# Patient Record
Sex: FEMALE | HISPANIC OR LATINO | URBAN - METROPOLITAN AREA
[De-identification: names, ages, dates, MRNs, and addresses within clinical notes are randomized per-mention and may not be internally consistent; named-entity substitution may affect disease eponyms.]

---

## 2021-12-21 ENCOUNTER — PREPPED CHART (OUTPATIENT)
Dept: URBAN - METROPOLITAN AREA CLINIC 98 | Facility: CLINIC | Age: 56
End: 2021-12-21

## 2021-12-21 PROBLEM — H35.412 LATTICE DEGENERATION OF RETINA: Noted: 2021-12-21

## 2021-12-21 PROBLEM — H35.712 CENTRAL SEROUS CHORIORETINOPATHY: Noted: 2021-12-21

## 2021-12-21 PROBLEM — H35.723 PED: Noted: 2021-12-21

## 2021-12-21 PROBLEM — H53.19 PHOTOPSIA: Noted: 2021-12-21

## 2021-12-21 PROBLEM — H43.811 POSTERIOR VITREOUS DETACHMENT: Noted: 2021-12-21

## 2021-12-21 PROBLEM — H35.721 PED: Noted: 2021-12-21

## 2022-02-11 ENCOUNTER — OFFICE VISIT (OUTPATIENT)
Dept: HEMATOLOGY | Age: 57
End: 2022-02-11

## 2022-02-11 VITALS
HEART RATE: 57 BPM | DIASTOLIC BLOOD PRESSURE: 79 MMHG | SYSTOLIC BLOOD PRESSURE: 127 MMHG | RESPIRATION RATE: 18 BRPM | TEMPERATURE: 97.7 F | WEIGHT: 138 LBS

## 2022-02-11 DIAGNOSIS — R74.8 ELEVATED ALKALINE PHOSPHATASE LEVEL: Primary | ICD-10-CM

## 2022-02-11 PROBLEM — Z90.49 HISTORY OF CHOLECYSTECTOMY: Status: ACTIVE | Noted: 2022-02-11

## 2022-02-11 PROBLEM — I10 HYPERTENSION: Status: ACTIVE | Noted: 2022-02-11

## 2022-02-11 PROBLEM — K76.0 FATTY LIVER: Status: ACTIVE | Noted: 2022-02-11

## 2022-02-11 LAB
ALBUMIN SERPL-MCNC: 4.3 G/DL (ref 3.5–5)
ALBUMIN/GLOB SERPL: 1.1 {RATIO} (ref 1.1–2.2)
ALP SERPL-CCNC: 148 U/L (ref 45–117)
ALT SERPL-CCNC: 41 U/L (ref 12–78)
ANION GAP SERPL CALC-SCNC: 2 MMOL/L (ref 5–15)
AST SERPL-CCNC: 25 U/L (ref 15–37)
BASOPHILS # BLD: 0 K/UL (ref 0–0.1)
BASOPHILS NFR BLD: 1 % (ref 0–1)
BILIRUB DIRECT SERPL-MCNC: 0.2 MG/DL (ref 0–0.2)
BILIRUB SERPL-MCNC: 0.9 MG/DL (ref 0.2–1)
BUN SERPL-MCNC: 15 MG/DL (ref 6–20)
BUN/CREAT SERPL: 18 (ref 12–20)
CALCIUM SERPL-MCNC: 9.8 MG/DL (ref 8.5–10.1)
CHLORIDE SERPL-SCNC: 105 MMOL/L (ref 97–108)
CO2 SERPL-SCNC: 31 MMOL/L (ref 21–32)
CREAT SERPL-MCNC: 0.83 MG/DL (ref 0.55–1.02)
DIFFERENTIAL METHOD BLD: NORMAL
EOSINOPHIL # BLD: 0.1 K/UL (ref 0–0.4)
EOSINOPHIL NFR BLD: 1 % (ref 0–7)
ERYTHROCYTE [DISTWIDTH] IN BLOOD BY AUTOMATED COUNT: 12.2 % (ref 11.5–14.5)
EST. AVERAGE GLUCOSE BLD GHB EST-MCNC: 117 MG/DL
FERRITIN SERPL-MCNC: 179 NG/ML (ref 26–388)
GLOBULIN SER CALC-MCNC: 3.9 G/DL (ref 2–4)
GLUCOSE SERPL-MCNC: 100 MG/DL (ref 65–100)
HBA1C MFR BLD: 5.7 % (ref 4–5.6)
HBV SURFACE AB SER QL: NONREACTIVE
HBV SURFACE AB SER-ACNC: 8.62 MIU/ML
HBV SURFACE AG SER QL: 0.13 INDEX
HBV SURFACE AG SER QL: NEGATIVE
HCT VFR BLD AUTO: 43.6 % (ref 35–47)
HGB BLD-MCNC: 13.8 G/DL (ref 11.5–16)
IMM GRANULOCYTES # BLD AUTO: 0 K/UL (ref 0–0.04)
IMM GRANULOCYTES NFR BLD AUTO: 0 % (ref 0–0.5)
IRON SATN MFR SERPL: 18 % (ref 20–50)
IRON SERPL-MCNC: 70 UG/DL (ref 35–150)
LYMPHOCYTES # BLD: 2 K/UL (ref 0.8–3.5)
LYMPHOCYTES NFR BLD: 43 % (ref 12–49)
MCH RBC QN AUTO: 30 PG (ref 26–34)
MCHC RBC AUTO-ENTMCNC: 31.7 G/DL (ref 30–36.5)
MCV RBC AUTO: 94.8 FL (ref 80–99)
MONOCYTES # BLD: 0.3 K/UL (ref 0–1)
MONOCYTES NFR BLD: 6 % (ref 5–13)
NEUTS SEG # BLD: 2.2 K/UL (ref 1.8–8)
NEUTS SEG NFR BLD: 49 % (ref 32–75)
NRBC # BLD: 0 K/UL (ref 0–0.01)
NRBC BLD-RTO: 0 PER 100 WBC
PLATELET # BLD AUTO: 218 K/UL (ref 150–400)
PMV BLD AUTO: 12.4 FL (ref 8.9–12.9)
POTASSIUM SERPL-SCNC: 4.2 MMOL/L (ref 3.5–5.1)
PROT SERPL-MCNC: 8.2 G/DL (ref 6.4–8.2)
RBC # BLD AUTO: 4.6 M/UL (ref 3.8–5.2)
SODIUM SERPL-SCNC: 138 MMOL/L (ref 136–145)
TIBC SERPL-MCNC: 388 UG/DL (ref 250–450)
WBC # BLD AUTO: 4.5 K/UL (ref 3.6–11)

## 2022-02-11 PROCEDURE — 99204 OFFICE O/P NEW MOD 45 MIN: CPT | Performed by: INTERNAL MEDICINE

## 2022-02-11 PROCEDURE — 91200 LIVER ELASTOGRAPHY: CPT | Performed by: INTERNAL MEDICINE

## 2022-02-11 RX ORDER — NEBIVOLOL 5 MG/1
TABLET ORAL
COMMUNITY
Start: 2021-07-10

## 2022-02-11 NOTE — Clinical Note
2/28/2022 7:33 AM    Ms. [facility]  [place] [id number]              Sincerely,      Zeb Aguilar MD

## 2022-02-11 NOTE — Clinical Note
NOTIFICATION RETURN TO WORK / SCHOOL    2/28/2022 7:33 AM    [address]  [address] [address]      To Whom It May Concern:    Alexx Richardson is currently under the care of [address] She will return to work/school on: ***    If there are questions or concerns please have the patient contact our office.         Sincerely,      Daphne Tabor MD

## 2022-02-11 NOTE — PROGRESS NOTES
Identified pt with two pt identifiers(name and ). Reviewed record in preparation for visit and have obtained necessary documentation. All patient medications has been reviewed. Chief Complaint   Patient presents with    Elevated Liver Enzymes    Fatty Liver    New Patient       3 most recent PHQ Screens 2022   Little interest or pleasure in doing things Not at all   Feeling down, depressed, irritable, or hopeless Not at all   Total Score PHQ 2 0     No flowsheet data found. Health Maintenance Due   Topic    Hepatitis C Screening     Depression Screen     Cervical cancer screen     Lipid Screen     Colorectal Cancer Screening Combo     Shingrix Vaccine Age 50> (1 of 2)    Breast Cancer Screen Mammogram     Flu Vaccine (1)    COVID-19 Vaccine (3 - Booster for Silverman Peter series)     Health Maintenance Review: Patient reminded of \"due or due soon\" health maintenance. I have asked the patient to contact his/her primary care provider (PCP) for follow-up on his/her health maintenance. Vitals:    22 0930   BP: 127/79   Pulse: (!) 57   Resp: 18   Temp: 97.7 °F (36.5 °C)   TempSrc: Temporal   Weight: 138 lb (62.6 kg)       Wt Readings from Last 3 Encounters:   22 138 lb (62.6 kg)     Temp Readings from Last 3 Encounters:   22 97.7 °F (36.5 °C) (Temporal)     BP Readings from Last 3 Encounters:   22 127/79     Pulse Readings from Last 3 Encounters:   22 (!) 57       Coordination of Care Questionnaire:   1) Have you been to an emergency room, urgent care, or hospitalized since your last visit? NO       2. Have seen or consulted any other health care provider since your last visit?  No

## 2022-02-11 NOTE — PROGRESS NOTES
Ko Perkins MD, [address], [facility], [place]      Claudean Rouleau, PA-C Harold Lang, [facility]     April S Kellee, [facility]   KIRSTIN Vazquez-RONALD Echevarria, [facility]       Ron [place] [address]    at [facility]    [address], [address] [facility], [place]  22.    [phone]    FAX: [address] [place] [address] Avenue    at [address], [address], [address]    [phone]    FAX: [phone]       Patient Care Team:  Zahraa Steel MD as PCP - General (Internal Medicine)      Problem List  Date Reviewed: 2/11/2022          Codes Class Noted    Fatty liver ICD-10-CM: K76.0  ICD-9-CM: 571.8  2/11/2022        Hypertension ICD-10-CM: I10  ICD-9-CM: 401.9  2/11/2022        History of cholecystectomy ICD-10-CM: Z90.49  ICD-9-CM: V45.79  2/11/2022              The clinicians listed above have asked me to see Sheila Parra in consultation regarding elevated liver enzymes and its management. All medical records sent by the referring physicians were reviewed including imaging studies     The patient is a 55 y.o.  female who was found to have elevated alkaline phosphatase in 2019. Serologic evaluation for markers of chronic liver disease has either not been performed or the results are not available. Ultrasound of the liver was performed in 5/2021. The results of the imaging suggested fatty liver disease. Assessment of liver fibrosis with Fibroscan was performed in the office today. The result was 3.7 kPa which correlates with no fibrosis. The CAP score of 295 suggests hepatic steatosis.     The patient has the following symptoms which could be attributed to the liver disorder:    pain in the right side over the liver,   Increased fatigue since 1/2021    The patient is not experiencing the following symptoms which are commonly seen in this liver disorder:   nausea,   vomiting,   constipation,   diarrhea,     The patient completes all daily activities without any functional limitations. ASSESSMENT AND PLAN:  Elevated liver enzymes  Persistent elevation in alkaline phosphatase of unclear etiology at this time. Have performed laboratory testing to monitor liver function and degree of liver injury. This included BMP, hepatic panel, CBC with platelet count, INR. Liver transaminases are normal.  ALP is elevated. Liver function is normal.  The platelet count is normal.      Based upon laboratory studies Fibroscan, and imaging  the patient does not appear to have significant liver injury. Serologic testing for causes of chronic liver disease was negative     The most likely causes for the liver chemistry abnormalities were discussed with the patient and include fatty liver disease,     The Fibroscan can be repeated annually or as often as clinically indicated to assess for fibrosis progression and/or regression. If the ALP remains elevated MRCP should be performed to exclude bile duct disease. Screening for Hepatocellular Carcinoma  HCC screening is not necessary if the patient has no evidence of cirrhosis. Treatment of other medical problems in patients with chronic liver disease  There are no contraindications for the patient to take most medications that are necessary for treatment of other medical issues. Counseling for alcohol in patients with chronic liver disease  The patient was counseled regarding alcohol consumption and the effect of alcohol on chronic liver disease. The patient does not consume any significant amount of alcohol. Vaccinations   Vaccination for viral hepatitis A and B is recommended since the patient has no serologic evidence of previous exposure or vaccination with immunity.     The patient has received 2 doses of COVID-19 vaccine. Routine vaccinations against other bacterial and viral agents can be performed as indicated. Annual flu vaccination should be administered if indicated. ALLERGIES  Allergies   Allergen Reactions    Contrast Agent [Iodine] Anaphylaxis       MEDICATIONS  Current Outpatient Medications   Medication Sig    nebivoloL (Bystolic) 5 mg tablet Bystolic 5 mg tablet   TAKE 1 TABLET BY MOUTH EVERY DAY FOR BLOOD PRESSURE     No current facility-administered medications for this visit. SYSTEM REVIEW NOT RELATED TO LIVER DISEASE OR REVIEWED ABOVE:  Constitution systems: Negative for fever, chills, weight gain, weight loss. Eyes: Negative for visual changes. ENT: Negative for sore throat, painful swallowing. Respiratory: Negative for cough, hemoptysis, SOB. Cardiology: Negative for chest pain, palpitations. GI:  Negative for constipation or diarrhea. : Negative for urinary frequency, dysuria, hematuria, nocturia. Skin: Negative for rash. Hematology: Negative for easy bruising, blood clots. Musculo-skelatal: Negative for back pain, muscle pain, weakness. Neurologic: Negative for headaches, dizziness, vertigo, memory problems not related to HE. Psychology: Negative for anxiety, depression. FAMILY HISTORY:  The father Has/had the following following chronic disease(s): prostate cancer. The mother Has/had the following chronic disease(s): Thyroid disease, CHOL. There is no family history of liver disease. SOCIAL HISTORY:  The patient is . The patient has 3 children,   The patient has never used tobacco products. The patient has never consumed significant amounts of alcohol. The patient does not work outside the home.         PHYSICAL EXAMINATION:  Visit Vitals  /79 (BP 1 Location: Left upper arm, BP Patient Position: Sitting, BP Cuff Size: Adult)   Pulse (!) 57   Temp 97.7 °F (36.5 °C) (Temporal)   Resp 18   Wt 138 lb (62.6 kg)     General: No acute distress. Eyes: Sclera anicteric. ENT: No oral lesions. Thyroid normal.  Nodes: No adenopathy. Skin: No spider angiomata. No jaundice. No palmar erythema. Respiratory: Lungs clear to auscultation. Cardiovascular: Regular heart rate. No murmurs. No JVD. Abdomen: Soft non-tender. Liver size normal to percussion/palpation. Spleen not palpable. No obvious ascites. Extremities: No edema. No muscle wasting. No gross arthritic changes. Neurologic: Alert and oriented. Cranial nerves grossly intact. No asterixis. LABORATORY STUDIES:  Liver [place] [address] Units 2/11/2022   WBC 3.6 - 11.0 K/uL 4.5   ANC 1.8 - 8.0 K/UL 2.2   HGB 11.5 - 16.0 g/dL 13.8    - 400 K/uL 218   AST 15 - 37 U/L 25   ALT 12 - 78 U/L 41   Alk Phos 45 - 117 U/L 148 (H)   Bili, Total 0.2 - 1.0 MG/DL 0.9   Bili, Direct 0.0 - 0.2 MG/DL 0.2   Albumin 3.5 - 5.0 g/dL 4.3   BUN 6 - 20 MG/DL 15   Creat 0.55 - 1.02 MG/DL 0.83   Na 136 - 145 mmol/L 138   K 3.5 - 5.1 mmol/L 4.2   Cl 97 - 108 mmol/L 105   CO2 21 - 32 mmol/L 31   Glucose 65 - 100 mg/dL 100     SEROLOGIES:  Serologies Latest Ref Rng & Units 2/11/2022   Hep A Ab, Total Negative   Negative   Hep B Surface Ag Index 0.13   Hep B Surface Ag Interp Negative   Negative   Hep B Core Ab, Total Negative   Negative   Hep B Surface Ab mIU/mL 8.62   Hep B Surface Ab Interp NONREACTIVE   NONREACTIVE   Hep C Ab 0.0 - 0.9 s/co ratio <0.1   Ferritin 26 - 388 NG/   Iron % Saturation 20 - 50 % 18 (L)   WEN, IFA  Negative   C-ANCA Neg:<1:20 titer <1:20   P-ANCA Neg:<1:20 titer <1:20   ANCA Neg:<1:20 titer <1:20   ASMCA 0 - 19 Units 6   M2 Ab 0.0 - 20.0 Units <20.0   Alpha-1 antitrypsin level 101 - 187 mg/dL 140     LIVER HISTOLOGY:  2/2022. FibroScan performed at [address]. EkPa was 3.7. IQR/med 24%. . The results suggested a fibrosis level of F0. The CAP score suggests there is hepatic steatosis.       ENDOSCOPIC PROCEDURES:  Not available or performed    RADIOLOGY:  5/2021. Ultrasound of liver [place], [place]). Echogenic consistent with fatty liver. No liver mass lesions. No dilated bile ducts. No ascites. OTHER TESTING:  Not available or performed    FOLLOW-UP:  All of the issues listed above in the Assessment and Plan were discussed with the patient. All questions were answered. The patient expressed a clear understanding of the above. [address] [place] [address] in 12 months for Fibroscan to review all data and determine the treatment plan.       Adriano Johnson MD  [address] 22.  [phone]  [address] [place] [address]

## 2022-02-12 LAB
ACTIN IGG SERPL-ACNC: 6 UNITS (ref 0–19)
HAV AB SER QL IA: NEGATIVE
HBV CORE AB SERPL QL IA: NEGATIVE
MITOCHONDRIA M2 IGG SER-ACNC: <20 UNITS (ref 0–20)

## 2022-02-13 LAB — A1AT SERPL-MCNC: 140 MG/DL (ref 101–187)

## 2022-02-15 LAB
ANA TITR SER IF: NEGATIVE {TITER}
C-ANCA TITR SER IF: NORMAL TITER
HCV AB S/CO SERPL IA: <0.1 S/CO RATIO (ref 0–0.9)
HCV AB SERPL QL IA: NORMAL
P-ANCA ATYPICAL TITR SER IF: NORMAL TITER
P-ANCA TITR SER IF: NORMAL TITER

## 2022-06-01 ASSESSMENT — VISUAL ACUITY
OS_CC: 20/20
OD_CC: 20/20

## 2022-06-01 ASSESSMENT — TONOMETRY
OD_IOP_MMHG: 16
OS_IOP_MMHG: 16

## 2022-06-29 ENCOUNTER — DILATED FUNDUS EXAM (OUTPATIENT)
Dept: URBAN - METROPOLITAN AREA CLINIC 98 | Facility: CLINIC | Age: 57
End: 2022-06-29

## 2022-06-29 DIAGNOSIS — H35.712: ICD-10-CM

## 2022-06-29 DIAGNOSIS — H35.723: ICD-10-CM

## 2022-06-29 DIAGNOSIS — H43.811: ICD-10-CM

## 2022-06-29 DIAGNOSIS — H35.412: ICD-10-CM

## 2022-06-29 DIAGNOSIS — H53.19: ICD-10-CM

## 2022-06-29 PROCEDURE — 92134 CPTRZ OPH DX IMG PST SGM RTA: CPT

## 2022-06-29 PROCEDURE — 92201 OPSCPY EXTND RTA DRAW UNI/BI: CPT

## 2022-06-29 PROCEDURE — 92014 COMPRE OPH EXAM EST PT 1/>: CPT

## 2022-06-29 ASSESSMENT — VISUAL ACUITY
OD_CC: 20/20
OS_CC: 20/20-1

## 2022-06-29 ASSESSMENT — TONOMETRY
OS_IOP_MMHG: 18
OD_IOP_MMHG: 17

## 2022-07-15 ENCOUNTER — PROCEDURE ONLY (OUTPATIENT)
Dept: URBAN - METROPOLITAN AREA CLINIC 98 | Facility: CLINIC | Age: 57
End: 2022-07-15

## 2022-07-15 DIAGNOSIS — H35.412: ICD-10-CM

## 2022-07-15 PROCEDURE — 67145 PROPH RTA DTCHMNT PC: CPT

## 2022-07-15 PROCEDURE — 92014 COMPRE OPH EXAM EST PT 1/>: CPT | Mod: 25

## 2022-07-15 ASSESSMENT — VISUAL ACUITY
OS_CC: 20/20-2
OD_CC: 20/20-1

## 2022-07-15 ASSESSMENT — TONOMETRY
OD_IOP_MMHG: 18
OS_IOP_MMHG: 18

## 2022-11-01 ENCOUNTER — FOLLOW UP (OUTPATIENT)
Dept: URBAN - METROPOLITAN AREA CLINIC 98 | Facility: CLINIC | Age: 57
End: 2022-11-01

## 2022-11-01 DIAGNOSIS — H35.723: ICD-10-CM

## 2022-11-01 DIAGNOSIS — H35.412: ICD-10-CM

## 2022-11-01 DIAGNOSIS — H35.712: ICD-10-CM

## 2022-11-01 DIAGNOSIS — H43.811: ICD-10-CM

## 2022-11-01 PROCEDURE — 92014 COMPRE OPH EXAM EST PT 1/>: CPT

## 2022-11-01 PROCEDURE — 92201 OPSCPY EXTND RTA DRAW UNI/BI: CPT

## 2022-11-01 PROCEDURE — 92134 CPTRZ OPH DX IMG PST SGM RTA: CPT

## 2022-11-01 ASSESSMENT — VISUAL ACUITY
OS_CC: 20/20
OD_CC: 20/20

## 2022-11-01 ASSESSMENT — TONOMETRY
OS_IOP_MMHG: 17
OD_IOP_MMHG: 15

## 2023-06-16 ENCOUNTER — FOLLOW UP (OUTPATIENT)
Dept: URBAN - METROPOLITAN AREA CLINIC 98 | Facility: CLINIC | Age: 58
End: 2023-06-16

## 2023-06-16 DIAGNOSIS — H53.19: ICD-10-CM

## 2023-06-16 DIAGNOSIS — H35.412: ICD-10-CM

## 2023-06-16 DIAGNOSIS — H35.712: ICD-10-CM

## 2023-06-16 DIAGNOSIS — H43.811: ICD-10-CM

## 2023-06-16 DIAGNOSIS — H35.723: ICD-10-CM

## 2023-06-16 PROCEDURE — 92134 CPTRZ OPH DX IMG PST SGM RTA: CPT

## 2023-06-16 PROCEDURE — 92014 COMPRE OPH EXAM EST PT 1/>: CPT

## 2023-06-16 PROCEDURE — 92201 OPSCPY EXTND RTA DRAW UNI/BI: CPT

## 2023-06-16 ASSESSMENT — VISUAL ACUITY
OS_CC: 20/20
OD_CC: 20/20

## 2023-06-16 ASSESSMENT — TONOMETRY
OD_IOP_MMHG: 20
OS_IOP_MMHG: 16

## 2023-12-19 ENCOUNTER — FOLLOW UP (OUTPATIENT)
Dept: URBAN - METROPOLITAN AREA CLINIC 98 | Facility: CLINIC | Age: 58
End: 2023-12-19

## 2023-12-19 DIAGNOSIS — H53.19: ICD-10-CM

## 2023-12-19 DIAGNOSIS — H35.723: ICD-10-CM

## 2023-12-19 DIAGNOSIS — H35.712: ICD-10-CM

## 2023-12-19 DIAGNOSIS — H43.811: ICD-10-CM

## 2023-12-19 DIAGNOSIS — H35.412: ICD-10-CM

## 2023-12-19 PROCEDURE — 92134 CPTRZ OPH DX IMG PST SGM RTA: CPT

## 2023-12-19 PROCEDURE — 92201 OPSCPY EXTND RTA DRAW UNI/BI: CPT

## 2023-12-19 PROCEDURE — 92014 COMPRE OPH EXAM EST PT 1/>: CPT

## 2023-12-19 ASSESSMENT — VISUAL ACUITY
OD_CC: 20/20
OS_CC: 20/20

## 2023-12-19 ASSESSMENT — TONOMETRY
OD_IOP_MMHG: 15
OS_IOP_MMHG: 18